# Patient Record
Sex: FEMALE | Race: WHITE | Employment: STUDENT | URBAN - METROPOLITAN AREA
[De-identification: names, ages, dates, MRNs, and addresses within clinical notes are randomized per-mention and may not be internally consistent; named-entity substitution may affect disease eponyms.]

---
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
## 2019-11-30 ENCOUNTER — HOSPITAL ENCOUNTER (OUTPATIENT)
Age: 19
Discharge: HOME | End: 2019-11-30
Attending: EMERGENCY MEDICINE
Payer: COMMERCIAL

## 2019-11-30 VITALS
WEIGHT: 250 LBS | OXYGEN SATURATION: 100 % | RESPIRATION RATE: 18 BRPM | SYSTOLIC BLOOD PRESSURE: 137 MMHG | HEART RATE: 94 BPM | TEMPERATURE: 98 F | HEIGHT: 71 IN | DIASTOLIC BLOOD PRESSURE: 85 MMHG | BODY MASS INDEX: 35 KG/M2

## 2019-11-30 DIAGNOSIS — H66.90 ACUTE OTITIS MEDIA, UNSPECIFIED OTITIS MEDIA TYPE: Primary | ICD-10-CM

## 2019-11-30 PROCEDURE — 99203 OFFICE O/P NEW LOW 30 MIN: CPT

## 2019-11-30 PROCEDURE — 87430 STREP A AG IA: CPT

## 2019-11-30 RX ORDER — AMOXICILLIN 500 MG/1
500 TABLET, FILM COATED ORAL 2 TIMES DAILY
Qty: 10 TABLET | Refills: 0 | Status: SHIPPED | OUTPATIENT
Start: 2019-11-30 | End: 2019-12-05

## 2019-11-30 RX ORDER — IBUPROFEN 600 MG/1
600 TABLET ORAL ONCE
Status: COMPLETED | OUTPATIENT
Start: 2019-11-30 | End: 2019-11-30

## 2019-11-30 NOTE — ED PROVIDER NOTES
Patient Seen in: [facility] In [address] [place] [address]      History   Patient presents with:  Cough/URI    Stated Complaint: cough     HPI     11-year-old female presents for evaluation of sore throat and right ear pain.   Patient reports sore thro Cardiovascular:      Rate and Rhythm: Normal rate and regular rhythm. Heart sounds: Normal heart sounds. Pulmonary:      Effort: Pulmonary effort is normal. No respiratory distress. Breath sounds: Normal breath sounds.    Musculoskeletal: Norm

## 2025-06-03 NOTE — PROGRESS NOTES
HPI:   Helen Cole is a 25 year old female who presents for a complete physical exam.     Patient presents with:  Routine Physical     Asthma F/u, exercise induced and RAD, seasonal and environmental-test triggers, patient complains of some periodic chest tightness/bronchospasm, uses albuterol which is helpful, not on daily medication, asthma control test 21 today    Mood good, no MARÍA off medication, sees counselorP,         See ROS below for other pertinent positive and negative complaints    I reviewed her's Past Medical History, Past Surgical History, Family and Social History     Labs:   Lab Results   Component Value Date/Time    WBC 6.9 03/29/2017 02:08 PM    HGB 10.6 (L) 03/29/2017 02:08 PM     (H) 03/29/2017 02:08 PM      Lab Results   Component Value Date/Time    GLU 98 03/29/2017 02:08 PM     03/29/2017 02:08 PM    K 4.0 03/29/2017 02:08 PM     03/29/2017 02:08 PM    CO2 24 03/29/2017 02:08 PM    CREATSERUM 0.60 03/29/2017 02:08 PM    CA 9.2 03/29/2017 02:08 PM    ALB 4.2 03/29/2017 02:08 PM    TP 7.2 03/29/2017 02:08 PM    ALKPHO 74 03/29/2017 02:08 PM    AST 17 03/29/2017 02:08 PM    ALT 11 (L) 03/29/2017 02:08 PM    BILT 0.1 (L) 03/29/2017 02:08 PM    TSH 2.78 03/29/2017 02:08 PM        No results found for: \"CHOLEST\", \"HDL\", \"TRIG\", \"LDL\", \"NONHDLC\"      Current Medications[1]   Past Medical History[2]   Past Surgical History[3]   Family History[4]  Allergies[5]   Social History:  Social Hx on file[6]      REVIEW OF SYSTEMS:   Review of Systems   Constitutional: Negative.    HENT: Negative.     Eyes: Negative.    Respiratory:  Positive for chest tightness (occassionally, RAD, no SOB or assoc's wheezing, uses albuterol prn). Negative for shortness of breath and wheezing.    Cardiovascular: Negative.  Negative for chest pain and palpitations.   Gastrointestinal: Negative.    Genitourinary: Negative.    Musculoskeletal: Negative.    Skin: Negative.    Neurological: Negative.     Psychiatric/Behavioral: Negative.         EXAM:   /86   Pulse 80   Wt 247 lb (112 kg)   LMP 05/23/2025 (Exact Date)   BMI 34.45 kg/m²  Body mass index is 34.45 kg/m².  Physical Exam  Vitals and nursing note reviewed.   Constitutional:       General: She is awake. She is not in acute distress.     Appearance: Normal appearance. She is well-developed and well-groomed. She is obese. She is not ill-appearing or toxic-appearing.   HENT:      Head: Normocephalic and atraumatic.      Right Ear: Tympanic membrane, ear canal and external ear normal. There is no impacted cerumen.      Left Ear: Tympanic membrane, ear canal and external ear normal. There is no impacted cerumen.      Nose: Nose normal. No rhinorrhea.      Mouth/Throat:      Mouth: Mucous membranes are moist.      Pharynx: Oropharynx is clear. No oropharyngeal exudate or posterior oropharyngeal erythema.   Eyes:      General: No scleral icterus.        Right eye: No discharge.         Left eye: No discharge.      Extraocular Movements: Extraocular movements intact.      Conjunctiva/sclera: Conjunctivae normal.      Pupils: Pupils are equal, round, and reactive to light.   Neck:      Thyroid: No thyroid mass, thyromegaly or thyroid tenderness.      Vascular: Normal carotid pulses. No carotid bruit.      Trachea: Trachea normal.   Cardiovascular:      Rate and Rhythm: Normal rate and regular rhythm.      Pulses: Normal pulses.      Heart sounds: Normal heart sounds, S1 normal and S2 normal. No murmur heard.     No S3 or S4 sounds.   Pulmonary:      Effort: Pulmonary effort is normal. No respiratory distress.      Breath sounds: Normal breath sounds and air entry. No stridor. No wheezing, rhonchi or rales.   Chest:      Chest wall: No tenderness.   Abdominal:      General: Bowel sounds are normal. There is no distension.      Palpations: Abdomen is soft. There is no hepatomegaly, splenomegaly or mass.      Tenderness: There is no abdominal tenderness.  There is no guarding or rebound.      Hernia: No hernia is present.   Musculoskeletal:         General: No swelling, tenderness or deformity.      Cervical back: Normal range of motion and neck supple. No rigidity or tenderness.      Right lower leg: No edema.      Left lower leg: No edema.   Lymphadenopathy:      Cervical: No cervical adenopathy.      Right cervical: No superficial, deep or posterior cervical adenopathy.     Left cervical: No superficial, deep or posterior cervical adenopathy.      Upper Body:      Right upper body: No supraclavicular or axillary adenopathy.      Left upper body: No supraclavicular or axillary adenopathy.      Lower Body: No right inguinal adenopathy. No left inguinal adenopathy.   Skin:     General: Skin is warm and dry.      Capillary Refill: Capillary refill takes less than 2 seconds.      Coloration: Skin is not jaundiced or pale.      Findings: No bruising, erythema or rash.      Nails: There is no clubbing.   Neurological:      General: No focal deficit present.      Mental Status: She is alert and oriented to person, place, and time.      Cranial Nerves: Cranial nerves 2-12 are intact. No cranial nerve deficit, dysarthria or facial asymmetry.      Sensory: Sensation is intact. No sensory deficit.      Motor: Motor function is intact. No weakness, tremor or abnormal muscle tone.      Coordination: Coordination is intact. Coordination normal.      Deep Tendon Reflexes: Reflexes are normal and symmetric. Reflexes normal.   Psychiatric:         Attention and Perception: Attention and perception normal.         Mood and Affect: Mood and affect normal.         Speech: Speech normal.         Behavior: Behavior normal. Behavior is cooperative.         Thought Content: Thought content normal. Thought content is not paranoid or delusional. Thought content does not include homicidal or suicidal ideation. Thought content does not include homicidal or suicidal plan.         Cognition and  Memory: Cognition and memory normal.         Judgment: Judgment normal.          ASSESSMENT AND PLAN:   1. Helen Cole is a 25 year old female who presents for a complete physical exam.    She is in good health.  Stress importance of exercise and healthy well balanced diet. Recommend low fat DASH diet and aerobic exercise 30+ minutes 4-5 times weekly, as tolerated.    Health maintenance:   At 19 y/o: Thin prep PAP q 3 years if h/o NL PAP, PAP deferred by patient  Self breast exam encouraged to perform monthly.  Fasting Lipids, CMP, TSH, HgbA1c (both parents have type 2 diabetes) and CBC  Immunizations: Tdap given today  We will follow-up pending results  The patient is asked to return for CPE in 1 year.    Patient also here for:  Medical problem(s)/complaints as below    1. Routine medical exam  - CBC With Differential With Platelet  - Comp Metabolic Panel (14)  - Lipid Panel  - TETANUS, DIPHTHERIA TOXOIDS AND ACELLULAR PERTUSIS VACCINE (TDAP), >7 YEARS, IM USE  - TSH W Reflex To Free T4; Future  - Hemoglobin A1C    2. Immunization due  - TETANUS, DIPHTHERIA TOXOIDS AND ACELLULAR PERTUSIS VACCINE (TDAP), >7 YEARS, IM USE    3. Mild intermittent asthma without complication (HCC)  Stable, uses albuterol as needed for and prior to/during exercise, ACT 21, AAP completed, continue present management, albuterol as needed  Follow-up 6 to 12 months or sooner as needed/as below    4. RALPH (generalized anxiety disorder) with depression, in partial remission  Mood improved and stable, no SHIP, off medication, patient sees counselor  Will continue present management, follow-up as directed and as below    5. Major depressive disorder in partial remission, unspecified whether recurrent  See above      Patient (and/or guardian or parent if less than 18 years old) was given instructions regarding diagnosis, management, expectations and follow up.    Patient Instructions   Will contact you/patient when the test(s):   LAB  result(s) are final and with further recommendations then if any changes.    Recommend:    Medication(s): Albuterol, as previously prescribed.    Follow-up:  With me pending the test results or sooner as needed.  With me in 6 to 12 months for your asthma recheck.  With your counselor and psychologist  as directed.    Go to the emergency room or call 911 for any new or suddenly worsening symptoms, any signs of acute distress, or emergent changes.      Orders Placed This Encounter   Procedures    CBC With Differential With Platelet    Comp Metabolic Panel (14)    Lipid Panel    TSH W Reflex To Free T4    Hemoglobin A1C    TETANUS, DIPHTHERIA TOXOIDS AND ACELLULAR PERTUSIS VACCINE (TDAP), >7 YEARS, IM USE     Meds & Refills for this Visit:  Requested Prescriptions      No prescriptions requested or ordered in this encounter     Other Orders, Imaging & Consults:  TETANUS, DIPHTHERIA TOXOIDS AND ACELLULAR PERTUSIS VACCINE (TDAP), >7 YEARS, IM USE    Return in about 6 months (around 12/5/2025) for asthma.  Follow up: as above (See AVS)    All questions were answered.  We discussed the indications, proper use, risks, and benefits of the above recommendations including any medication(s) as prescribed.  The patient (and/or guardian or parent if less than 18 years old) indicate(s) understanding and agree(s) to the above plan of care.    Maribel Hopkins MD         [1]   Current Outpatient Medications   Medication Sig Dispense Refill    Albuterol Sulfate  (90 BASE) MCG/ACT Inhalation Aero Soln Inhale 2 puffs into the lungs every 6 (six) hours as needed for Wheezing.      Multiple Vitamins-Iron (MULTIVITAMIN/IRON OR) Take 1 tablet by mouth daily. (Patient not taking: Reported on 6/5/2025)     [2]   Past Medical History:   Asthma (HCC)    exercised and hot weather induced, uses inhaler prn, no attack since 2 yrs ago    Exotropia    Alternate patching   [3]   Past Surgical History:  Procedure Laterality Date     Other surgical history      right arm fracture 10 yrs ago, pins placed   [4]   Family History  Problem Relation Age of Onset    Diabetes Paternal Grandmother     High Cholesterol Paternal Grandmother     Diabetes Paternal Grandfather     Diabetes Other         pgaunts & uncles    Heart Disease Other         mggf, mggp, pggm    High Cholesterol Other         pggf, mggm    Depression Other         had ECT and inpatient hospitalizations, was on medication for much of her life    Asthma Mother     High Blood Pressure Mother     High Blood Pressure Father     High Blood Pressure Other         mggf, mggp, pggm    Cancer Other         mggm (uterine), mgcousin (lung)   [5]   Allergies  Allergen Reactions    Grass     Mold     Pollen    [6]   Social History  Socioeconomic History    Marital status: Single   Tobacco Use    Smoking status: Never    Smokeless tobacco: Never   Vaping Use    Vaping status: Never Used   Substance and Sexual Activity    Alcohol use: Yes     Comment: socially    Drug use: No

## 2025-06-05 ENCOUNTER — OFFICE VISIT (OUTPATIENT)
Dept: FAMILY MEDICINE CLINIC | Facility: CLINIC | Age: 25
End: 2025-06-05
Payer: COMMERCIAL

## 2025-06-05 ENCOUNTER — LAB ENCOUNTER (OUTPATIENT)
Dept: LAB | Age: 25
End: 2025-06-05
Attending: FAMILY MEDICINE
Payer: COMMERCIAL

## 2025-06-05 VITALS
SYSTOLIC BLOOD PRESSURE: 129 MMHG | HEART RATE: 80 BPM | BODY MASS INDEX: 34 KG/M2 | DIASTOLIC BLOOD PRESSURE: 86 MMHG | WEIGHT: 247 LBS

## 2025-06-05 DIAGNOSIS — Z00.00 ROUTINE MEDICAL EXAM: ICD-10-CM

## 2025-06-05 DIAGNOSIS — Z00.00 ROUTINE MEDICAL EXAM: Primary | ICD-10-CM

## 2025-06-05 DIAGNOSIS — F32.4 MAJOR DEPRESSIVE DISORDER IN PARTIAL REMISSION, UNSPECIFIED WHETHER RECURRENT: ICD-10-CM

## 2025-06-05 DIAGNOSIS — Z23 IMMUNIZATION DUE: ICD-10-CM

## 2025-06-05 DIAGNOSIS — F41.1 GAD (GENERALIZED ANXIETY DISORDER): ICD-10-CM

## 2025-06-05 DIAGNOSIS — J45.20 MILD INTERMITTENT ASTHMA WITHOUT COMPLICATION (HCC): ICD-10-CM

## 2025-06-05 LAB
ALBUMIN SERPL-MCNC: 5.1 G/DL (ref 3.2–4.8)
ALBUMIN/GLOB SERPL: 2.1 {RATIO} (ref 1–2)
ALP LIVER SERPL-CCNC: 79 U/L (ref 37–98)
ALT SERPL-CCNC: 16 U/L (ref 10–49)
ANION GAP SERPL CALC-SCNC: 8 MMOL/L (ref 0–18)
AST SERPL-CCNC: 20 U/L (ref ?–34)
BASOPHILS # BLD AUTO: 0.04 X10(3) UL (ref 0–0.2)
BASOPHILS NFR BLD AUTO: 0.5 %
BILIRUB SERPL-MCNC: 0.3 MG/DL (ref 0.3–1.2)
BUN BLD-MCNC: 10 MG/DL (ref 9–23)
BUN/CREAT SERPL: 11.2 (ref 10–20)
CALCIUM BLD-MCNC: 9.6 MG/DL (ref 8.7–10.4)
CHLORIDE SERPL-SCNC: 106 MMOL/L (ref 98–112)
CHOLEST SERPL-MCNC: 195 MG/DL (ref ?–200)
CO2 SERPL-SCNC: 26 MMOL/L (ref 21–32)
CREAT BLD-MCNC: 0.89 MG/DL (ref 0.55–1.02)
DEPRECATED RDW RBC AUTO: 41.7 FL (ref 35.1–46.3)
EGFRCR SERPLBLD CKD-EPI 2021: 92 ML/MIN/1.73M2 (ref 60–?)
EOSINOPHIL # BLD AUTO: 0.07 X10(3) UL (ref 0–0.7)
EOSINOPHIL NFR BLD AUTO: 0.9 %
ERYTHROCYTE [DISTWIDTH] IN BLOOD BY AUTOMATED COUNT: 13 % (ref 11–15)
EST. AVERAGE GLUCOSE BLD GHB EST-MCNC: 111 MG/DL (ref 68–126)
FASTING PATIENT LIPID ANSWER: YES
FASTING STATUS PATIENT QL REPORTED: YES
GLOBULIN PLAS-MCNC: 2.4 G/DL (ref 2–3.5)
GLUCOSE BLD-MCNC: 96 MG/DL (ref 70–99)
HBA1C MFR BLD: 5.5 % (ref ?–5.7)
HCT VFR BLD AUTO: 42.2 % (ref 35–48)
HDLC SERPL-MCNC: 38 MG/DL (ref 40–59)
HGB BLD-MCNC: 13.6 G/DL (ref 12–16)
IMM GRANULOCYTES # BLD AUTO: 0.01 X10(3) UL (ref 0–1)
IMM GRANULOCYTES NFR BLD: 0.1 %
LDLC SERPL CALC-MCNC: 139 MG/DL (ref ?–100)
LYMPHOCYTES # BLD AUTO: 1.77 X10(3) UL (ref 1–4)
LYMPHOCYTES NFR BLD AUTO: 23.9 %
MCH RBC QN AUTO: 28.2 PG (ref 26–34)
MCHC RBC AUTO-ENTMCNC: 32.2 G/DL (ref 31–37)
MCV RBC AUTO: 87.4 FL (ref 80–100)
MONOCYTES # BLD AUTO: 0.65 X10(3) UL (ref 0.1–1)
MONOCYTES NFR BLD AUTO: 8.8 %
NEUTROPHILS # BLD AUTO: 4.88 X10 (3) UL (ref 1.5–7.7)
NEUTROPHILS # BLD AUTO: 4.88 X10(3) UL (ref 1.5–7.7)
NEUTROPHILS NFR BLD AUTO: 65.8 %
NONHDLC SERPL-MCNC: 157 MG/DL (ref ?–130)
OSMOLALITY SERPL CALC.SUM OF ELEC: 289 MOSM/KG (ref 275–295)
PLATELET # BLD AUTO: 387 10(3)UL (ref 150–450)
POTASSIUM SERPL-SCNC: 4.6 MMOL/L (ref 3.5–5.1)
PROT SERPL-MCNC: 7.5 G/DL (ref 5.7–8.2)
RBC # BLD AUTO: 4.83 X10(6)UL (ref 3.8–5.3)
SODIUM SERPL-SCNC: 140 MMOL/L (ref 136–145)
TRIGL SERPL-MCNC: 99 MG/DL (ref 30–149)
TSI SER-ACNC: 2 UIU/ML (ref 0.55–4.78)
VLDLC SERPL CALC-MCNC: 18 MG/DL (ref 0–30)
WBC # BLD AUTO: 7.4 X10(3) UL (ref 4–11)

## 2025-06-05 PROCEDURE — 80061 LIPID PANEL: CPT | Performed by: FAMILY MEDICINE

## 2025-06-05 PROCEDURE — 84443 ASSAY THYROID STIM HORMONE: CPT

## 2025-06-05 PROCEDURE — 80053 COMPREHEN METABOLIC PANEL: CPT | Performed by: FAMILY MEDICINE

## 2025-06-05 PROCEDURE — 83036 HEMOGLOBIN GLYCOSYLATED A1C: CPT | Performed by: FAMILY MEDICINE

## 2025-06-05 PROCEDURE — 85025 COMPLETE CBC W/AUTO DIFF WBC: CPT | Performed by: FAMILY MEDICINE

## 2025-06-05 PROCEDURE — 36415 COLL VENOUS BLD VENIPUNCTURE: CPT | Performed by: FAMILY MEDICINE

## 2025-06-05 NOTE — PATIENT INSTRUCTIONS
Will contact you/patient when the test(s):   LAB result(s) are final and with further recommendations then if any changes.    Recommend:    Medication(s): Albuterol, as previously prescribed.    Follow-up:  With me pending the test results or sooner as needed.  With me in 6 to 12 months for your asthma recheck.  With your counselor and psychologist  as directed.    Go to the emergency room or call 911 for any new or suddenly worsening symptoms, any signs of acute distress, or emergent changes.

## 2025-06-06 NOTE — PROGRESS NOTES
Hi Helen Cole,    Attached are the results of your recently performed labs/tests:    All results are essentially within NORMAL limits.    EXCEPT:    Your albumin, a protein, is mildly elevated.    Your lipids: HDL, good Cholesterol, is low, and LDL, bad Cholesterol, is mildly elevated.  I recommend watching your diet (a low fat and cholesterol, well balanced Mediterranean or DASH diet) more carefully and exercising 4-5 times a week for 30+ minutes each time (or as tolerated).     (Your hemoglobin A1c, test for diabetes and prediabetes, is normal.)    (Your CBC, complete blood count, is normal.)      Please:   Recheck as below:  The albumin in 1 month.  The lipids in one year.  Follow up:  Pending the results     Take care,     Maribel Hopkins MD  6/6/2025    (Report(s) are attached, future lab/test orders or any referrals are in your chart/MyChart or will be mailed to you)

## 2025-06-06 NOTE — PROGRESS NOTES
Hi Helen Cole,    Attached are the results of your recently performed labs/tests:    Your TSH, thyroid test, is within NORMAL limits.    Take care,     Maribel Hopkins MD  6/6/2025    (Report(s) are attached, future lab/test orders or any referrals are in your chart/MyChart or will be mailed to you)

## (undated) NOTE — LETTER
ASTHMA ACTION PLAN for Helen Cole     : 2000     Date: 25  Doctor:  Maribel Hopkins MD  Phone for doctor or clinic: [facility], [address], [place] [address]  [place] [address] [phone]      ACT Score: 21    ACT Goal: 20 or greater    Call your provider if you require your rescue/quick reliever medication more than 2-3 times in a 24 hour period.    If you require your rescue inhaler/medication more than 2-3 times weekly, your asthma may not be under proper control and you should seek medical attention.    *Quick Relievers are Xopenex and Albuterol*    You can use the colors of a traffic light to help learn about your asthma medicines.  Year Round       1. Green - Go! % of Personal Best Peak Flow   Use controller medicine.   Breathing is good  No cough or wheeze  Can work and play Medicine How much to take When to take it    Medications       Sympathomimetics Instructions     Albuterol Sulfate  (90 BASE) MCG/ACT Inhalation Aero Soln Inhale 2 puffs into the lungs every 6 (six) hours as needed for Wheezing and just prior to or during exercise as needed                    2. Yellow - Caution. 50-79% Personal Best Peak Flow  Use reliever medicine to keep an asthma attack from getting bad.   Cough  Quick Relievers  Wheezing  Tight Chest  Wake up at night Medicine How much to take When to take it    If symptoms are not improving in 24-48 hrs, call office for further instructions  Medications       Sympathomimetics Instructions     Albuterol Sulfate  (90 BASE) MCG/ACT Inhalation Aero Soln Inhale 2 puffs into the lungs every 4-6 (six) hours as needed for Wheezing and just prior to or during exercise as needed                    3. Red - Stop! Danger! <50% Personal Best Peak Flow  Continue Controller Medications But ADD:   Medicine not helping  Breathing is hard and fast  Nose opens wide  Can't walk  Ribs show  Can't talk well Medicine  How much to take When to take it    If your symptoms do not improve in ONE hour -  go to the emergency room or call 911 immediately! If symptoms improve, call office for appointment immediately.    Albuterol inhaler 2 puffs every 20 minutes for three treatments       Don't forget:  Remember to get your Flu vaccine every fall!    [x] Asthma Action Plan reviewed with patient, and a copy of the plan was given to the patient via [employer]         Signatures:     Provider  Maribel Hopkins MD Patient  Helen Cole Caretaker